# Patient Record
Sex: MALE | Race: OTHER | HISPANIC OR LATINO | Employment: FULL TIME | URBAN - METROPOLITAN AREA
[De-identification: names, ages, dates, MRNs, and addresses within clinical notes are randomized per-mention and may not be internally consistent; named-entity substitution may affect disease eponyms.]

---

## 2021-03-12 ENCOUNTER — HOSPITAL ENCOUNTER (EMERGENCY)
Facility: HOSPITAL | Age: 51
Discharge: HOME | End: 2021-03-12
Attending: EMERGENCY MEDICINE | Admitting: EMERGENCY MEDICINE

## 2021-03-12 VITALS
RESPIRATION RATE: 18 BRPM | TEMPERATURE: 97.5 F | SYSTOLIC BLOOD PRESSURE: 143 MMHG | DIASTOLIC BLOOD PRESSURE: 88 MMHG | OXYGEN SATURATION: 98 % | HEART RATE: 68 BPM

## 2021-03-12 DIAGNOSIS — R42 VERTIGO: Primary | ICD-10-CM

## 2021-03-12 LAB
ATRIAL RATE: 67 BPM
GLUCOSE SERPL-MCNC: 139 MG/DL (ref 65–140)
P AXIS: 69 DEGREES
PR INTERVAL: 156 MS
QRS AXIS: 60 DEGREES
QRSD INTERVAL: 88 MS
QT INTERVAL: 382 MS
QTC INTERVAL: 403 MS
T WAVE AXIS: 57 DEGREES
VENTRICULAR RATE: 67 BPM

## 2021-03-12 PROCEDURE — 99284 EMERGENCY DEPT VISIT MOD MDM: CPT

## 2021-03-12 PROCEDURE — 93010 ELECTROCARDIOGRAM REPORT: CPT | Performed by: INTERNAL MEDICINE

## 2021-03-12 PROCEDURE — 99285 EMERGENCY DEPT VISIT HI MDM: CPT | Performed by: EMERGENCY MEDICINE

## 2021-03-12 PROCEDURE — 93005 ELECTROCARDIOGRAM TRACING: CPT

## 2021-03-12 PROCEDURE — 82948 REAGENT STRIP/BLOOD GLUCOSE: CPT

## 2021-03-12 NOTE — ED PROVIDER NOTES
History  Chief Complaint   Patient presents with    Dizziness     arrives via ems from work where he began to feel dizzy approx 30 min pta  states he suffers from high blood sugar  Pt is a 53year old male with a  PMH of type II DM presenting with dizziness  Pt states he was at work when he had a gradual onset of dizziness  Pt states the episode lasted for about 30 minutes  Pt states he did not feel like he was going to pass out, but that he was unbalanced and needed to hold onto something  Associated nausea but denies vomiting  States now that he is resting and sitting still he has no symptoms  Denies chest pain, SOB, palpitations  Denies cardiac or stroke history  Denies similar symptoms in the past  Blood sugar was normal PTA  History provided by:  Patient   used: Yes ([place])    Dizziness  Quality:  Imbalance and vertigo  Severity:  Moderate  Onset quality:  Gradual  Progression:  Resolved  Chronicity:  New  Context: physical activity    Relieved by:  Being still  Associated symptoms: no headaches and no weakness    Risk factors: no heart disease, no hx of vertigo, no Meniere's disease, no multiple medications and no new medications        Prior to Admission Medications   Prescriptions Last Dose Informant Patient Reported? Taking?   metFORMIN (GLUCOPHAGE) 500 mg tablet   Yes Yes   Sig: Take 500 mg by mouth 2 (two) times a day with meals      Facility-Administered Medications: None       Past Medical History:   Diagnosis Date    Diabetes mellitus ([facility] [address] )        History reviewed  No pertinent surgical history  History reviewed  No pertinent family history  I have reviewed and agree with the history as documented      E-Cigarette/Vaping     E-Cigarette/Vaping Substances     Social History     Tobacco Use    Smoking status: Never Smoker    Smokeless tobacco: Never Used   Substance Use Topics    Alcohol use: Not Currently    Drug use: Not Currently       Review of Systems Constitutional: Negative  HENT: Negative  Respiratory: Negative  Cardiovascular: Negative  Gastrointestinal: Negative  Genitourinary: Negative  Musculoskeletal: Negative  Neurological: Positive for dizziness  Negative for seizures, syncope, facial asymmetry, speech difficulty, weakness, light-headedness, numbness and headaches  All other systems reviewed and are negative  Physical Exam  Physical Exam  Constitutional:       General: He is not in acute distress  Appearance: He is well-developed  He is not diaphoretic  HENT:      Head: Normocephalic and atraumatic  Right Ear: External ear normal       Left Ear: External ear normal       Nose: Nose normal    Eyes:      General: No scleral icterus  Right eye: No discharge  Left eye: No discharge  Extraocular Movements: Extraocular movements intact  Conjunctiva/sclera: Conjunctivae normal       Pupils: Pupils are equal, round, and reactive to light  Neck:      Musculoskeletal: Normal range of motion and neck supple  Cardiovascular:      Rate and Rhythm: Normal rate and regular rhythm  Heart sounds: Normal heart sounds  Pulmonary:      Effort: Pulmonary effort is normal       Breath sounds: Normal breath sounds  Musculoskeletal: Normal range of motion  Skin:     General: Skin is warm and dry  Neurological:      General: No focal deficit present  Mental Status: He is alert and oriented to person, place, and time  GCS: GCS eye subscore is 4  GCS verbal subscore is 5  GCS motor subscore is 6  Cranial Nerves: Cranial nerves are intact  Sensory: Sensation is intact  No sensory deficit  Motor: Motor function is intact  Coordination: Coordination is intact  Gait: Gait is intact     Psychiatric:         Mood and Affect: Mood normal          Behavior: Behavior normal          Vital Signs  ED Triage Vitals   Temperature Pulse Respirations Blood Pressure SpO2 03/12/21 0323 03/12/21 0320 03/12/21 0320 03/12/21 0320 03/12/21 0320   97 5 °F (36 4 °C) 68 18 143/88 98 %      Temp Source Heart Rate Source Patient Position - Orthostatic VS BP Location FiO2 (%)   03/12/21 0323 03/12/21 0320 03/12/21 0320 03/12/21 0320 --   Oral Monitor Lying Right arm       Pain Score       03/12/21 0320       No Pain           Vitals:    03/12/21 0320   BP: 143/88   Pulse: 68   Patient Position - Orthostatic VS: Lying         Visual Acuity      ED Medications  Medications - No data to display    Diagnostic Studies  Results Reviewed     Procedure Component Value Units Date/Time    Fingerstick Glucose (POCT) [[id number]]  (Normal) Collected: 03/12/21 0324    Lab Status: Final result Updated: 03/12/21 0325     POC Glucose 139 mg/dl                  No orders to display              Procedures  ECG 12 Lead Documentation Only    Date/Time: 3/12/2021 4:40 AM  Performed by: Maycol Renee PA-C  Authorized by: Maycol Renee PA-C     ECG reviewed by me, the ED Provider: yes    Patient location:  ED  Previous ECG:     Previous ECG:  Unavailable    Comparison to cardiac monitor: No    Interpretation:     Interpretation: normal    Rate:     ECG rate:  67    ECG rate assessment: normal    Rhythm:     Rhythm: sinus rhythm    Ectopy:     Ectopy: none    QRS:     QRS axis:  Normal    QRS intervals:  Normal  Conduction:     Conduction: normal    ST segments:     ST segments:  Normal  T waves:     T waves: normal               ED Course                             SBIRT 20yo+      Most Recent Value   SBIRT (22 yo +)   In order to provide better care to our patients, we are screening all of our patients for alcohol and drug use  Would it be okay to ask you these screening questions? Yes Filed at: 03/12/2021 9613   Initial Alcohol Screen: US AUDIT-C    1  How often do you have a drink containing alcohol?  0 Filed at: 03/12/2021 0323   2   How many drinks containing alcohol do you have on a typical day you are drinking? 0 Filed at: 03/12/2021 0323   3a  Male UNDER 65: How often do you have five or more drinks on one occasion? 0 Filed at: 03/12/2021 0323   Audit-C Score  0 Filed at: 03/12/2021 2546   SIRISHA: How many times in the past year have you    Used an illegal drug or used a prescription medication for non-medical reasons? Never Filed at: 03/12/2021 5049                    MDM  Number of Diagnoses or Management Options  Vertigo: new and requires workup  Diagnosis management comments: Pt symptoms have resolved in the ED  EKG is negative and I do not suspect cardiac causes  Non-focal neuro exam and non ataxic gait  Educated on return precautions  Stable for discharge  Amount and/or Complexity of Data Reviewed  Tests in the medicine section of CPT®: ordered and reviewed  Independent visualization of images, tracings, or specimens: yes    Risk of Complications, Morbidity, and/or Mortality  Presenting problems: moderate  Management options: moderate    Patient Progress  Patient progress: stable      Disposition  Final diagnoses:   Vertigo     Time reflects when diagnosis was documented in both MDM as applicable and the Disposition within this note     Time User Action Codes Description Comment    3/12/2021  3:35 AM Stacie Giles Add [R42] Vertigo       ED Disposition     ED Disposition Condition Date/Time Comment    Discharge Good Fri Mar 12, 2021  3:35 AM Ronie Runner discharge to home/self care              Follow-up Information     Follow up With Specialties Details Why Contact Info Additional [facility] Schedule an appointment as soon as possible for a visit today  [address], [address] [address]  [address], [address], [address] [place], [place], [address]          Discharge Medication List as of 3/12/2021  3:35 AM      CONTINUE these medications which have NOT CHANGED    Details   metFORMIN (GLUCOPHAGE) 500 mg tablet Take 500 mg by mouth 2 (two) times a day with meals, Historical Med           No discharge procedures on file      PDMP Review     None          ED Provider  Electronically Signed by           Krish Mayfield PA-C  03/12/21 7474

## 2021-03-12 NOTE — Clinical Note
James Mendenhall was seen and treated in our emergency department on 3/12/2021  Diagnosis:     Gaudencio Melton  may return to work on return date  He may return on this date: 03/13/2021         If you have any questions or concerns, please don't hesitate to call        Albert Hathaway PA-C    ______________________________           _______________          _______________  Hospital Representative                              Date                                Time

## 2022-08-04 ENCOUNTER — TELEPHONE (OUTPATIENT)
Dept: OBGYN CLINIC | Facility: MEDICAL CENTER | Age: 52
End: 2022-08-04

## 2022-08-04 NOTE — TELEPHONE ENCOUNTER
Patient calling for an appointment  He has a laceration on right pinky finger (4 stitches) this will be workmans comp   Went to [facility]    Patient:  Francheska Peterson    MRN: [id number]    :  1970    Phone: [phone]    Location:  Any, any dr Leatha Galindo

## 2022-08-09 ENCOUNTER — TELEPHONE (OUTPATIENT)
Dept: OBGYN CLINIC | Facility: MEDICAL CENTER | Age: 52
End: 2022-08-09

## 2022-08-09 ENCOUNTER — TELEPHONE (OUTPATIENT)
Dept: OBGYN CLINIC | Facility: HOSPITAL | Age: 52
End: 2022-08-09

## 2022-08-09 ENCOUNTER — OFFICE VISIT (OUTPATIENT)
Dept: OBGYN CLINIC | Facility: MEDICAL CENTER | Age: 52
End: 2022-08-09

## 2022-08-09 VITALS — DIASTOLIC BLOOD PRESSURE: 84 MMHG | WEIGHT: 180 LBS | HEART RATE: 76 BPM | SYSTOLIC BLOOD PRESSURE: 120 MMHG

## 2022-08-09 DIAGNOSIS — S61.216A LACERATION OF RIGHT LITTLE FINGER WITHOUT FOREIGN BODY, NAIL DAMAGE STATUS UNSPECIFIED, INITIAL ENCOUNTER: Primary | ICD-10-CM

## 2022-08-09 PROCEDURE — 99204 OFFICE O/P NEW MOD 45 MIN: CPT | Performed by: ORTHOPAEDIC SURGERY

## 2022-08-09 RX ORDER — TERBINAFINE HYDROCHLORIDE 250 MG/1
250 TABLET ORAL DAILY
COMMUNITY
Start: 2022-08-03 | End: 2023-08-03

## 2022-08-09 RX ORDER — DULAGLUTIDE 0.75 MG/.5ML
0.75 INJECTION, SOLUTION SUBCUTANEOUS WEEKLY
COMMUNITY
Start: 2022-08-03

## 2022-08-09 RX ORDER — CLOTRIMAZOLE 1 %
1 CREAM (GRAM) TOPICAL 2 TIMES DAILY
COMMUNITY
Start: 2022-08-03 | End: 2023-08-03

## 2022-08-09 RX ORDER — KETOCONAZOLE 20 MG/ML
SHAMPOO TOPICAL
COMMUNITY
Start: 2022-08-04 | End: 2022-09-03

## 2022-08-09 NOTE — TELEPHONE ENCOUNTER
Patient arrived at appointment, is a workmans comp issue, but did not have all of their information  I entered what information they did have, and scanned in the form  We processed today as self-pay but they will be calling to add all of the other information in and have it go under w/c

## 2022-08-09 NOTE — LETTER
August 9, 2022     Patient: ySed Beasley  YOB: 1970  Date of Visit: 8/9/2022      To Whom it May Concern:    Syed Beasley is under my professional care  Ratna Guzman was seen in my office on 8/9/2022  Ratna Guzman may return to work on 8/15/22 with no restrictions  If you have any questions or concerns, please don't hesitate to call           Sincerely,          Radha Diane        CC: No Recipients

## 2022-08-09 NOTE — TELEPHONE ENCOUNTER
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

## 2022-08-09 NOTE — PROGRESS NOTES
[facility] SERG Bailey [address] [place] [id number]       [facility]  [id number]  1970    ORTHOPAEDIC SURGERY OUTPATIENT NOTE  8/9/2022      HISTORY:  47 y o  male  presents with right small finger laceration  Patient states a week ago he was at work when a metal ramp sliced his right pinky finger  He was digit up at urgent care  Patient does have significant history of diabetes  He does have his tetanus shot up-to-date  He localizes the laceration to the tip of the 5th digit  Denies any numbness or tingling  Denies any significant decreased range of motion  Past Medical History:   Diagnosis Date    Diabetes mellitus ([facility] [address] )        History reviewed  No pertinent surgical history  Social History     Socioeconomic History    Marital status: Single     Spouse name: Not on file    Number of children: Not on file    Years of education: Not on file    Highest education level: Not on file   Occupational History    Not on file   Tobacco Use    Smoking status: Never Smoker    Smokeless tobacco: Never Used   Substance and Sexual Activity    Alcohol use: Not Currently    Drug use: Not Currently    Sexual activity: Not on file   Other Topics Concern    Not on file   Social History Narrative    Not on file     Social Determinants of Health     Financial Resource Strain: Not on file   Food Insecurity: Not on file   Transportation Needs: Not on file   Physical Activity: Not on file   Stress: Not on file   Social Connections: Not on file   Intimate Partner Violence: Not on file   Housing Stability: Not on file       No family history on file       Patient's Medications   New Prescriptions    No medications on file   Previous Medications    CLOTRIMAZOLE (LOTRIMIN) 1 % CREAM    Apply 1 application topically 2 (two) times a day    DULAGLUTIDE (TRULICITY) 0 39 RS/5 0HZ SOPN    Inject 0 75 mg under the skin Once a week    KETOCONAZOLE (NIZORAL) 2 % SHAMPOO    Apply topically    METFORMIN (GLUCOPHAGE) 500 MG TABLET    Take 500 mg by mouth 2 (two) times a day with meals    TERBINAFINE (LAMISIL) 250 MG TABLET    Take 250 mg by mouth daily    TRIAMCINOLONE (KENALOG) 0 1 % OINTMENT    Apply 1 application topically 2 (two) times a day   Modified Medications    No medications on file   Discontinued Medications    No medications on file       No Known Allergies     /84   Pulse 76   Wt 81 6 kg (180 lb)      REVIEW OF SYSTEMS:  Constitutional: Negative  HEENT: Negative  Respiratory: Negative  Skin: Negative  Neurological: Negative  Psychiatric/Behavioral: Negative  Musculoskeletal: Negative except for that mentioned in the HPI     [unfilled]     PHYSICAL EXAM:  Right small finger:  3 stitches are seen at the tip of the 5th digit  This involves the pulp and ulnar aspect of the 5th digit  The wound is intact and shows no signs of infection  There is eschar forming  Procedure:  3 stitches were removed from the finger today  The patient tolerated the procedure well  IMAGING:  No images were taken in office today    ASSESSMENT AND PLAN:  47 y o  male  with right small finger laceration healing  Clinically the laceration is healing well  There is no signs infection  The patient was given a letter to return to work in 2 weeks which will give him plenty time to heal his laceration  At this time the patient follow-up on an as-needed basis

## 2022-08-09 NOTE — LETTER
August 9, 2022     Patient: James Melo  YOB: 1970  Date of Visit: 8/9/2022      To Whom it May Concern:    James Melo is under my professional care  Marylou Caibrittney was seen in my office on 8/9/2022  Marylou Freitas may return to work on 8/22/22 with no restrictions  If you have any questions or concerns, please don't hesitate to call           Sincerely,          Radha Diane        CC: No Recipients

## 2022-08-10 NOTE — TELEPHONE ENCOUNTER
Called and left vm about letter being ready for  at the [place] office  Phone number was also left if they us to fax it